# Patient Record
Sex: FEMALE | Race: WHITE | NOT HISPANIC OR LATINO | URBAN - METROPOLITAN AREA
[De-identification: names, ages, dates, MRNs, and addresses within clinical notes are randomized per-mention and may not be internally consistent; named-entity substitution may affect disease eponyms.]

---

## 2024-09-15 ENCOUNTER — OFFICE VISIT (OUTPATIENT)
Dept: URGENT CARE | Facility: CLINIC | Age: 29
End: 2024-09-15
Payer: COMMERCIAL

## 2024-09-15 VITALS
OXYGEN SATURATION: 99 % | DIASTOLIC BLOOD PRESSURE: 77 MMHG | BODY MASS INDEX: 24.14 KG/M2 | WEIGHT: 163 LBS | SYSTOLIC BLOOD PRESSURE: 117 MMHG | HEIGHT: 69 IN | HEART RATE: 70 BPM | RESPIRATION RATE: 18 BRPM | TEMPERATURE: 97.4 F

## 2024-09-15 DIAGNOSIS — B34.9 VIRAL SYNDROME: Primary | ICD-10-CM

## 2024-09-15 PROCEDURE — S9083 URGENT CARE CENTER GLOBAL: HCPCS | Performed by: PHYSICIAN ASSISTANT

## 2024-09-15 PROCEDURE — G0382 LEV 3 HOSP TYPE B ED VISIT: HCPCS | Performed by: PHYSICIAN ASSISTANT

## 2024-09-15 RX ORDER — ALBUTEROL SULFATE 90 UG/1
2 INHALANT RESPIRATORY (INHALATION) EVERY 4 HOURS PRN
Qty: 6.7 G | Refills: 0 | Status: SHIPPED | OUTPATIENT
Start: 2024-09-15

## 2024-09-15 RX ORDER — PREDNISONE 20 MG/1
40 TABLET ORAL DAILY
Qty: 10 TABLET | Refills: 0 | Status: SHIPPED | OUTPATIENT
Start: 2024-09-15 | End: 2024-09-20

## 2024-09-15 NOTE — PROGRESS NOTES
[facility]        NAME: Sharifa Parra is a 29 y.o. female  : 1995    MRN: [id number]  DATE: September 15, 2024  TIME: 11:52 AM    Assessment and Plan   Viral syndrome [B34.9]  1. Viral syndrome  predniSONE 20 mg tablet    albuterol (Proventil HFA) 90 mcg/act inhaler            Patient Instructions   Over-the-counter cold and flu medications as needed for symptoms.  Drink plenty of fluids.  Follow-up with the PCP in 3 to 5 days if her symptoms do not improve.  Go to ER if symptoms become severe.    Over-the-counter cold medication 101:  - Guaifenesin (ex. Mucinex) is a mucus thinner.  Good for sinus or chest congestion.  Works best if you drink plenty of fluids.  -Dextromethorphan (ex. Delsym, Mucinex DM) as a cough suppressant  -Pseudoephedrine (ex. Sudaphed) is a decongestion and should not be used by those with high blood pressure or heart problems.  -Flonase is a intranasal steroid.  Good for sinus congestion and postnasal drip.  -Antihistamines (Claritin, Zyrtec, Allegra Benadryl) are used for allergies as well as colds for treatment of congestion and ear fullness.      Follow up with PCP in 3-5 days.  Proceed to  ER if symptoms worsen.    If tests have been performed at [facility], our office will contact you with results if changes need to be made to the care plan discussed with you at the visit.  [facility].    Chief Complaint     Chief Complaint   Patient presents with    Cough     Starting 4 days ago fever and cough. Feeling tight and SOB. Urgent video visit yesterday, tessalon pearls prescribed but didn't help. No more fever. Headaches from coughing         History of Present Illness       Patient a 29-year-old female with no significant past medical history presents the office complaining of fever 101.7, headaches, congestion, cough, sore throat, chest tightness, shortness of breath.  States her stomach is upset with some diarrhea.  Had  "telemedicine visit yesterday and was diagnosed with viral URI.  She was given Tessalon Perles which are not helping with her symptoms. Fevers resolved.        Review of Systems   Review of Systems   Constitutional:  Positive for chills, fatigue and fever (101.7).   HENT:  Positive for congestion, postnasal drip and rhinorrhea. Negative for sore throat.    Respiratory:  Positive for cough, chest tightness and shortness of breath.    Cardiovascular:  Negative for chest pain and palpitations.   Gastrointestinal:  Positive for abdominal pain and diarrhea. Negative for nausea and vomiting.   Skin:  Negative for rash.   Neurological:  Positive for headaches. Negative for dizziness and light-headedness.         Current Medications       Current Outpatient Medications:     albuterol (Proventil HFA) 90 mcg/act inhaler, Inhale 2 puffs every 4 (four) hours as needed for wheezing or shortness of breath, Disp: 6.7 g, Rfl: 0    norethindrone-ethinyl estradiol-iron (MICROGESTIN FE1.5/30) 1.5-30 MG-MCG tablet, Take 1 tablet by mouth in the morning., Disp: , Rfl:     predniSONE 20 mg tablet, Take 2 tablets (40 mg total) by mouth daily for 5 days, Disp: 10 tablet, Rfl: 0    Current Allergies     Allergies as of 09/15/2024    (No Known Allergies)            The following portions of the patient's history were reviewed and updated as appropriate: allergies, current medications, past family history, past medical history, past social history, past surgical history and problem list.     Past Medical History:   Diagnosis Date    No known problems        Past Surgical History:   Procedure Laterality Date    NO PAST SURGERIES         History reviewed. No pertinent family history.      Medications have been verified.        Objective   /77   Pulse 70   Temp (!) 97.4 °F (36.3 °C)   Resp 18   Ht 5' 9\" (1.753 m)   Wt 73.9 kg (163 lb)   SpO2 99%   BMI 24.07 kg/m²   No LMP recorded.       Physical Exam     Physical Exam  Vitals and " nursing note reviewed.   Constitutional:       Appearance: Normal appearance. She is well-developed.   HENT:      Head: Normocephalic and atraumatic.      Right Ear: Tympanic membrane, ear canal and external ear normal.      Left Ear: Tympanic membrane, ear canal and external ear normal.      Nose: Congestion and rhinorrhea present.      Mouth/Throat:      Pharynx: Uvula midline.   Eyes:      General: Lids are normal.      Conjunctiva/sclera: Conjunctivae normal.      Pupils: Pupils are equal, round, and reactive to light.   Cardiovascular:      Rate and Rhythm: Normal rate and regular rhythm.      Pulses: Normal pulses.      Heart sounds: Normal heart sounds. No murmur heard.     No friction rub. No gallop.   Pulmonary:      Effort: Pulmonary effort is normal.      Breath sounds: Normal breath sounds. No wheezing, rhonchi or rales.   Musculoskeletal:         General: Normal range of motion.      Cervical back: Neck supple.   Lymphadenopathy:      Cervical: No cervical adenopathy.   Skin:     General: Skin is warm and dry.      Capillary Refill: Capillary refill takes less than 2 seconds.   Neurological:      Mental Status: She is alert.